# Patient Record
Sex: MALE | Race: WHITE | URBAN - METROPOLITAN AREA
[De-identification: names, ages, dates, MRNs, and addresses within clinical notes are randomized per-mention and may not be internally consistent; named-entity substitution may affect disease eponyms.]

---

## 2022-12-08 ENCOUNTER — OFFICE VISIT (OUTPATIENT)
Dept: URBAN - METROPOLITAN AREA CLINIC 44 | Facility: CLINIC | Age: 67
End: 2022-12-08
Payer: MEDICARE

## 2022-12-08 DIAGNOSIS — H02.834 DERMATOCHALASIS OF LEFT UPPER EYELID: ICD-10-CM

## 2022-12-08 DIAGNOSIS — H02.831 DERMATOCHALASIS OF RIGHT UPPER EYELID: Primary | ICD-10-CM

## 2022-12-08 DIAGNOSIS — H35.373 PUCKERING OF MACULA, BILATERAL: ICD-10-CM

## 2022-12-08 DIAGNOSIS — H25.13 AGE-RELATED NUCLEAR CATARACT, BILATERAL: ICD-10-CM

## 2022-12-08 DIAGNOSIS — H04.123 TEAR FILM INSUFFICIENCY OF BILATERAL LACRIMAL GLANDS: ICD-10-CM

## 2022-12-08 PROCEDURE — 92134 CPTRZ OPH DX IMG PST SGM RTA: CPT | Performed by: OPTOMETRIST

## 2022-12-08 PROCEDURE — 99204 OFFICE O/P NEW MOD 45 MIN: CPT | Performed by: OPTOMETRIST

## 2022-12-08 ASSESSMENT — INTRAOCULAR PRESSURE
OS: 12
OD: 12

## 2022-12-08 ASSESSMENT — KERATOMETRY
OS: 44.25
OD: 44.38

## 2022-12-08 ASSESSMENT — VISUAL ACUITY
OD: 20/30
OS: 20/30

## 2022-12-08 NOTE — IMPRESSION/PLAN
Impression: Puckering of macula, bilateral: H35.373. Plan: ERM w/moderate thickening OU. Patient recently saw Dr. Angela Doran at [facility] who recommended cataract surgery. Will get Dr. Ruthy Wadsworth notes. Will have patient follow up with Dr. Angela Doran after surgery. He understands vision may be limited due to ERM OU.

## 2022-12-08 NOTE — IMPRESSION/PLAN
Impression: Dermatochalasis of right upper eyelid: H02.831. Plan: Not bothersome to patient. Can see Oculoplastics in future if worsens.

## 2022-12-08 NOTE — IMPRESSION/PLAN
Impression: Age-related nuclear cataract, bilateral: H25.13. Plan: Discussed cataracts with patient. Discussed treatment options. Surgical treatment is recommended. Surgical risks and benefits discussed. Patient elects surgical treatment. Recommend surgery OU, OD first. Risk for inflammation, use Prednisone/Diclofenac drops. standard lens, LenSx, ORA. Aim OD: plano. Aim OS: plano. May need glasses for best vision after surgery. **Patient recently saw Dr. Mili Escobedo [facility]) who recommended cataract surgery. Need to get notes from most recent visit to confirm clearance for surgery**. **Co-manage with Dr. Murphy Patient** Outcome of surgery limitations include:  Dermatochalasis of right upper eyelid, Dermatochalasis of left upper eyelid, Tear film insufficiency of bilateral lacrimal glands, and Puckering of macula, bilateral.

## 2023-02-02 ENCOUNTER — OFFICE VISIT (OUTPATIENT)
Dept: URBAN - METROPOLITAN AREA CLINIC 44 | Facility: CLINIC | Age: 68
End: 2023-02-02
Payer: MEDICARE

## 2023-02-02 DIAGNOSIS — Z01.818 ENCOUNTER FOR OTHER PREPROCEDURAL EXAMINATION: Primary | ICD-10-CM

## 2023-02-02 DIAGNOSIS — H25.13 AGE-RELATED NUCLEAR CATARACT, BILATERAL: Primary | ICD-10-CM

## 2023-02-02 PROCEDURE — 99203 OFFICE O/P NEW LOW 30 MIN: CPT | Performed by: PHYSICIAN ASSISTANT

## 2023-02-02 ASSESSMENT — PACHYMETRY
OS: 3.05
OD: 2.95
OS: 23.35
OD: 23.09

## 2023-02-08 ENCOUNTER — PRE-OPERATIVE VISIT (OUTPATIENT)
Dept: URBAN - METROPOLITAN AREA CLINIC 44 | Facility: CLINIC | Age: 68
End: 2023-02-08
Payer: MEDICARE

## 2023-02-08 DIAGNOSIS — H52.223 REGULAR ASTIGMATISM, BILATERAL: ICD-10-CM

## 2023-02-08 DIAGNOSIS — H25.13 AGE-RELATED NUCLEAR CATARACT, BILATERAL: Primary | ICD-10-CM

## 2023-02-08 PROCEDURE — 99204 OFFICE O/P NEW MOD 45 MIN: CPT | Performed by: OPHTHALMOLOGY

## 2023-02-08 ASSESSMENT — PACHYMETRY
OD: 2.95
OD: 23.09
OS: 23.35
OS: 3.05

## 2023-02-08 NOTE — IMPRESSION/PLAN
Impression: Age-related nuclear cataract, bilateral: H25.13. Plan: Discussed cataract diagnosis with the patient. Discussed and reviewed treatment options for cataracts. Surgical treatment is required for cataracts. Risks and benefits of surgical treatment were discussed and understood. Patient elects surgical treatment. Recommend surgery OU,  OD first. PLAN STD LENS, DISTANCE AIM, ORA, REVIEWED SANGITA, PLAN LRI. PATIENT UNDERSTANDS THE NEED FOR GLASSES POST-OP FOR BEST OVER ALL VISION. Discussed limitations to vision post op due to ERM, DRY EYES. INJECTABLE, DEXTENZA 1ST CHOICE, TRIMOXI 2ND + DROPS OU.    If first eye doing well, ok to proceed with second eye surgery

## 2023-02-16 ENCOUNTER — POST-OPERATIVE VISIT (OUTPATIENT)
Dept: URBAN - METROPOLITAN AREA CLINIC 44 | Facility: CLINIC | Age: 68
End: 2023-02-16

## 2023-02-16 DIAGNOSIS — Z48.810 ENCOUNTER FOR SURGICAL AFTERCARE FOLLOWING SURGERY ON A SENSE ORGAN: Primary | ICD-10-CM

## 2023-02-16 PROCEDURE — 99024 POSTOP FOLLOW-UP VISIT: CPT | Performed by: OPTOMETRIST

## 2023-02-16 ASSESSMENT — INTRAOCULAR PRESSURE: OD: 15

## 2023-02-23 ENCOUNTER — POST-OPERATIVE VISIT (OUTPATIENT)
Dept: URBAN - METROPOLITAN AREA CLINIC 44 | Facility: CLINIC | Age: 68
End: 2023-02-23
Payer: MEDICARE

## 2023-02-23 DIAGNOSIS — Z48.810 ENCOUNTER FOR SURGICAL AFTERCARE FOLLOWING SURGERY ON A SENSE ORGAN: Primary | ICD-10-CM

## 2023-02-23 PROCEDURE — 99024 POSTOP FOLLOW-UP VISIT: CPT | Performed by: OPTOMETRIST

## 2023-02-23 ASSESSMENT — VISUAL ACUITY
OD: 20/20
OS: 20/30

## 2023-02-23 ASSESSMENT — INTRAOCULAR PRESSURE
OD: 12
OS: 14

## 2023-02-23 NOTE — IMPRESSION/PLAN
Impression: S/P Cataract Extraction by phacoemulsification with IOL placement; LRI (Limbal Relaxing Incision); ORA OD - 8 Days. Encounter for surgical aftercare following surgery on a sense organ  Z48.810. Plan: Okay for surgery second eye.

## 2023-03-02 ENCOUNTER — POST-OPERATIVE VISIT (OUTPATIENT)
Dept: URBAN - METROPOLITAN AREA CLINIC 44 | Facility: CLINIC | Age: 68
End: 2023-03-02
Payer: MEDICARE

## 2023-03-02 DIAGNOSIS — Z48.810 ENCOUNTER FOR SURGICAL AFTERCARE FOLLOWING SURGERY ON A SENSE ORGAN: Primary | ICD-10-CM

## 2023-03-02 PROCEDURE — 99024 POSTOP FOLLOW-UP VISIT: CPT | Performed by: OPTOMETRIST

## 2023-03-02 ASSESSMENT — INTRAOCULAR PRESSURE: OS: 12

## 2023-03-02 NOTE — IMPRESSION/PLAN
Impression: S/P Cataract Extraction by phacoemulsification with IOL placement; ORA; LRI (Limbal Relaxing Incision) OS - 1 Day. Encounter for surgical aftercare following surgery on a sense organ  Z48.810. Plan: Post-op instructions reviewed. Continue PF and Diclov gtts tis for 3 wks and d/c. Pre ERM OU OCT Mac today unchange no cme.  See Dr Rusty Clarke for all PO  care